# Patient Record
Sex: FEMALE | Race: WHITE | Employment: FULL TIME | URBAN - METROPOLITAN AREA
[De-identification: names, ages, dates, MRNs, and addresses within clinical notes are randomized per-mention and may not be internally consistent; named-entity substitution may affect disease eponyms.]

---

## 2018-12-03 ENCOUNTER — TRANSFERRED RECORDS (OUTPATIENT)
Dept: HEALTH INFORMATION MANAGEMENT | Facility: CLINIC | Age: 23
End: 2018-12-03

## 2018-12-05 ENCOUNTER — TRANSFERRED RECORDS (OUTPATIENT)
Dept: HEALTH INFORMATION MANAGEMENT | Facility: CLINIC | Age: 23
End: 2018-12-05

## 2020-01-02 ENCOUNTER — TRANSFERRED RECORDS (OUTPATIENT)
Dept: HEALTH INFORMATION MANAGEMENT | Facility: CLINIC | Age: 25
End: 2020-01-02

## 2020-01-23 ENCOUNTER — TRANSFERRED RECORDS (OUTPATIENT)
Dept: HEALTH INFORMATION MANAGEMENT | Facility: CLINIC | Age: 25
End: 2020-01-23

## 2020-02-11 ENCOUNTER — TRANSFERRED RECORDS (OUTPATIENT)
Dept: HEALTH INFORMATION MANAGEMENT | Facility: CLINIC | Age: 25
End: 2020-02-11

## 2020-02-13 ENCOUNTER — TRANSFERRED RECORDS (OUTPATIENT)
Dept: HEALTH INFORMATION MANAGEMENT | Facility: CLINIC | Age: 25
End: 2020-02-13

## 2020-02-17 ENCOUNTER — TRANSFERRED RECORDS (OUTPATIENT)
Dept: HEALTH INFORMATION MANAGEMENT | Facility: CLINIC | Age: 25
End: 2020-02-17

## 2020-02-18 ENCOUNTER — TRANSFERRED RECORDS (OUTPATIENT)
Dept: HEALTH INFORMATION MANAGEMENT | Facility: CLINIC | Age: 25
End: 2020-02-18

## 2020-02-25 ENCOUNTER — TRANSFERRED RECORDS (OUTPATIENT)
Dept: HEALTH INFORMATION MANAGEMENT | Facility: CLINIC | Age: 25
End: 2020-02-25

## 2020-02-26 ENCOUNTER — TRANSFERRED RECORDS (OUTPATIENT)
Dept: HEALTH INFORMATION MANAGEMENT | Facility: CLINIC | Age: 25
End: 2020-02-26

## 2020-02-26 LAB
ALT SERPL-CCNC: 16 U/L (ref 8–45)
AST SERPL-CCNC: 15 U/L (ref 11–43)
CREAT SERPL-MCNC: 0.77 MG/DL (ref 0.55–1.28)
GFR SERPL CREATININE-BSD FRML MDRD: >60 ML/MIN/1.73M(2)
GLUCOSE SERPL-MCNC: 104 MG/DL (ref 70–110)
POTASSIUM SERPL-SCNC: 3.7 MMOL/L (ref 3.5–5.1)

## 2020-03-02 ENCOUNTER — TRANSFERRED RECORDS (OUTPATIENT)
Dept: HEALTH INFORMATION MANAGEMENT | Facility: CLINIC | Age: 25
End: 2020-03-02

## 2020-03-04 NOTE — PROGRESS NOTES
[facility] General Surgery Clinic Consultation    CHIEF COMPLAINT:  Chief Complaint   Patient presents with     Consult     RLQ pain       HISTORY OF PRESENT ILLNESS:  Melissa Benitez is a 24 year old female who is seen in consultation at the request of  for evaluation of right inguinal hernia- chronic right lower quadrant pain.  Patient actually presents with 2 separate problems.  For the last couple of weeks, the patient states that she has been having back pain and right-sided abdominal pain, particularly after eating.  She has associated nausea but no vomiting.  No significant reflux.  Due to the severity of her symptoms, the patient was seen in the emergency department in [place].  HIDA scan was completed and demonstrated a decreased ejection fraction at 23%.    In addition, the patient complains of chronic right inguinal pain.  The patient had been suffering from right inguinal pain.  She was evaluated by surgeon in [place] and subsequently underwent open repair of an indirect right inguinal hernia without placement of mesh in December 2018.  Initially, the patient reports that her pain resolved postoperatively.  Approximately 9 months after surgery, the patient reports that she developed recurrence of her right inguinal pain in the same spot that it had previously been.  She has not noticed any associated bulging.  She has undergone steroid injections for the pain, which she states have not provided any significant relief.  Patient has undergone imaging with both CT and pelvic ultrasound.  No significant findings were noted on either exam.  Patient reports she has not undergone any previous abdominal surgical procedures.  She denies use of tobacco.    REVIEW OF SYSTEMS:  Constitutional: No fevers or chills  Eyes: No blurred or double vision  HENT: Reports headaches, No rhinorrhea, No sore throat  Respiratory: No cough or shortness of breath  Cardiovascular: Denies chest pain or  "palpitations  Gastrointestinal: Abdominal pain and nausea  Genitourinary: No hematuria or dysuria  Musculoskeletal: Denies arthralgias or myalgias  Neurologic: No numbness or tingling  Integumentary: No skin rashes    Past medical history:  -Assessed and noncontributory    No past surgical history on file.    Family history:  -Mother with diabetes mellitus  -Grandmother with breast cancer  -Father and sister with \"polycystic disease \"    Social history:  -Patient denies use of tobacco  -Occasional alcohol use    Allergies not on file    No current outpatient medications on file.       Vitals: /62   Pulse 108   BMI= There is no height or weight on file to calculate BMI.    EXAM:  General: Vital signs reviewed, in no apparent distress  Eyes: Anicteric  HENT: Normocephalic, atraumatic, trachea midline   Respiratory: Breathing nonlabored  Cardiovascular: Regular rate and rhythm  GI: Abdomen soft, nondistended; focally tender with palpation in the right upper quadrant of the abdomen; tender with palpation along the right inguinal canal, most pronounced at the medial aspect  Musculoskeletal: No gross deformities  Neurologic: Grossly nonfocal exam  Psychiatric: Normal mood, affect and insight  Integumentary: Warm and dry    All labs and imaging personally reviewed and significant for:   Abdominal CT from 2/17/2020 without evidence of significant findings  HIDA scan from 2/25/2020 with reduced contraction of the gallbladder following CCK challenge, calculated ejection fraction of 23%   Pelvic ultrasound from 2/13/2020 unremarkable    ASSESSMENT:  Melissa Benitez is a 24 year old who presents with biliary dyskinesia and chronic right inguinal pain, status post previous open hernia repair.  Significant pertinent co-morbidities include: Obesity.       PLAN:  A thorough discussion was had today in clinic regarding surgical options for the patient's abdominal issues.  I believe both issues could be addressed " simultaneously through a combined robotic approach.  Robotic cholecystectomy and its risks and benefits were discussed.  During the same procedure, I would then proceed with robotic exploration of the right inguinal region.  If a hernia is identified, I would proceed with repair with mesh.  If no obvious findings are noted, we would proceed with robotic inguinal neurectomy.  Risks and benefits of this procedure were discussed.  Patient is in agreement with proceeding.  Surgery will be scheduled at the patient's earliest convenience.    It was my pleasure to participate in the care of Melissa Benitez in clinic today. Thank you for this consultation.         Kaylee Cobb MD    Please route or send letter to:  Primary Care Provider (PCP)

## 2020-03-06 ENCOUNTER — OFFICE VISIT (OUTPATIENT)
Dept: SURGERY | Facility: CLINIC | Age: 25
End: 2020-03-06
Payer: COMMERCIAL

## 2020-03-06 ENCOUNTER — TELEPHONE (OUTPATIENT)
Dept: SURGERY | Facility: CLINIC | Age: 25
End: 2020-03-06

## 2020-03-06 DIAGNOSIS — K82.8 BILIARY DYSKINESIA: Primary | ICD-10-CM

## 2020-03-06 DIAGNOSIS — R10.31 RIGHT INGUINAL PAIN: ICD-10-CM

## 2020-03-06 PROCEDURE — 99204 OFFICE O/P NEW MOD 45 MIN: CPT | Performed by: SURGERY

## 2020-03-06 ASSESSMENT — MIFFLIN-ST. JEOR: SCORE: 1368.24

## 2020-03-06 NOTE — TELEPHONE ENCOUNTER
Type of surgery: robotic assisted lap alejo, robotic RIH exploration poss neurectomy  Location of surgery: [facility]  Date and time of surgery: 5/20/20 at 7:30am  Surgeon: Dr Cobb  Pre-Op Appt Date: pt to schedule  Post-Op Appt Date: pt to schedule   Packet sent out: Yes  Pre-cert/Authorization completed:  Not Applicable  Date: 3/6/20    General anesthesia  BRP to assist AMRIT

## 2020-03-09 VITALS
SYSTOLIC BLOOD PRESSURE: 116 MMHG | HEIGHT: 59 IN | DIASTOLIC BLOOD PRESSURE: 62 MMHG | WEIGHT: 156 LBS | HEART RATE: 108 BPM | BODY MASS INDEX: 31.45 KG/M2

## 2020-03-09 RX ORDER — LIDOCAINE HYDROCHLORIDE 30 MG/G
CREAM TOPICAL
COMMUNITY
Start: 2019-05-03

## 2020-03-09 RX ORDER — MEDROXYPROGESTERONE ACETATE 150 MG/ML
150 INJECTION, SUSPENSION INTRAMUSCULAR
COMMUNITY
Start: 2019-08-16 | End: 2020-07-17

## 2020-03-09 RX ORDER — METHOCARBAMOL 750 MG/1
TABLET ORAL
Status: ON HOLD | COMMUNITY
Start: 2019-05-03 | End: 2020-05-20

## 2020-03-09 RX ORDER — IBUPROFEN 600 MG/1
600 TABLET, FILM COATED ORAL
COMMUNITY
Start: 2019-07-10

## 2020-03-09 RX ORDER — HYDROCODONE BITARTRATE AND ACETAMINOPHEN 5; 325 MG/1; MG/1
TABLET ORAL
Status: ON HOLD | COMMUNITY
Start: 2019-07-09 | End: 2020-05-20

## 2020-04-23 ENCOUNTER — TRANSFERRED RECORDS (OUTPATIENT)
Dept: HEALTH INFORMATION MANAGEMENT | Facility: CLINIC | Age: 25
End: 2020-04-23

## 2020-04-30 ENCOUNTER — TRANSFERRED RECORDS (OUTPATIENT)
Dept: HEALTH INFORMATION MANAGEMENT | Facility: CLINIC | Age: 25
End: 2020-04-30

## 2020-05-13 DIAGNOSIS — Z11.59 ENCOUNTER FOR SCREENING FOR OTHER VIRAL DISEASES: Primary | ICD-10-CM

## 2020-05-15 ENCOUNTER — NURSE TRIAGE (OUTPATIENT)
Dept: NURSING | Facility: CLINIC | Age: 25
End: 2020-05-15

## 2020-05-15 NOTE — TELEPHONE ENCOUNTER
Caller is calling to get the request for the  covid 19 test to be sent to the hospital in [place] which is closer to her house. She has been on the phone for 2 hours trying to get a hold of the provider to do this. I called the after hours line and sent a page to the on call, Dr Cobb who happens to be her provider for her surgery. Dr Cobb will call the patient back and get all the necessary information to get the order closer to the patients home.    Ariana Love RN/ [facility]         Additional Information    Caller has already spoken with another triager or PCP AND has further questions AND triager able to answer questions.    Protocols used: NO CONTACT OR DUPLICATE CONTACT CALL-A-

## 2020-05-15 NOTE — TELEPHONE ENCOUNTER
Requesting a closer testing site for her Covid testing.  Warm transferred to Covid testing line.    Clarice Georges RN  [facility]

## 2020-05-18 ENCOUNTER — OFFICE VISIT (OUTPATIENT)
Dept: FAMILY MEDICINE | Facility: CLINIC | Age: 25
End: 2020-05-18
Attending: SURGERY
Payer: COMMERCIAL

## 2020-05-18 DIAGNOSIS — Z11.59 ENCOUNTER FOR SCREENING FOR OTHER VIRAL DISEASES: ICD-10-CM

## 2020-05-18 PROCEDURE — 87635 SARS-COV-2 COVID-19 AMP PRB: CPT | Performed by: SURGERY

## 2020-05-18 PROCEDURE — 99207 ZZC NO CHARGE NURSE ONLY: CPT

## 2020-05-18 PROCEDURE — 99000 SPECIMEN HANDLING OFFICE-LAB: CPT | Performed by: SURGERY

## 2020-05-18 NOTE — PROGRESS NOTES
Patient is here for a Pre-op COVID Swab. Swab completed with no concerns, and specimen walked down to lab.     Geno Ruby CMA (AAMA) 5/18/2020 3:37 PM

## 2020-05-19 ENCOUNTER — ANESTHESIA EVENT (OUTPATIENT)
Dept: SURGERY | Facility: CLINIC | Age: 25
End: 2020-05-19
Payer: COMMERCIAL

## 2020-05-19 LAB
SARS-COV-2 RNA SPEC QL NAA+PROBE: NOT DETECTED
SPECIMEN SOURCE: NORMAL

## 2020-05-20 ENCOUNTER — ANESTHESIA (OUTPATIENT)
Dept: SURGERY | Facility: CLINIC | Age: 25
End: 2020-05-20
Payer: COMMERCIAL

## 2020-05-20 ENCOUNTER — APPOINTMENT (OUTPATIENT)
Dept: SURGERY | Facility: PHYSICIAN GROUP | Age: 25
End: 2020-05-20
Payer: COMMERCIAL

## 2020-05-20 ENCOUNTER — HOSPITAL ENCOUNTER (OUTPATIENT)
Facility: CLINIC | Age: 25
Discharge: HOME | End: 2020-05-20
Attending: SURGERY | Admitting: SURGERY
Payer: COMMERCIAL

## 2020-05-20 VITALS
RESPIRATION RATE: 17 BRPM | OXYGEN SATURATION: 98 % | DIASTOLIC BLOOD PRESSURE: 65 MMHG | WEIGHT: 162 LBS | BODY MASS INDEX: 32.72 KG/M2 | SYSTOLIC BLOOD PRESSURE: 121 MMHG | TEMPERATURE: 97 F | HEART RATE: 122 BPM

## 2020-05-20 DIAGNOSIS — K82.8 BILIARY DYSKINESIA: ICD-10-CM

## 2020-05-20 DIAGNOSIS — G89.18 ACUTE POST-OPERATIVE PAIN: Primary | ICD-10-CM

## 2020-05-20 DIAGNOSIS — R10.31 RIGHT INGUINAL PAIN: ICD-10-CM

## 2020-05-20 LAB — HCG UR QL: NEGATIVE

## 2020-05-20 PROCEDURE — 25000128 H RX IP 250 OP 636: Performed by: ANESTHESIOLOGY

## 2020-05-20 PROCEDURE — 25800030 ZZH RX IP 258 OP 636: Performed by: NURSE ANESTHETIST, CERTIFIED REGISTERED

## 2020-05-20 PROCEDURE — 25000128 H RX IP 250 OP 636: Performed by: NURSE ANESTHETIST, CERTIFIED REGISTERED

## 2020-05-20 PROCEDURE — 36000087 ZZH SURGERY LEVEL 8 EA 15 ADDTL MIN: Performed by: SURGERY

## 2020-05-20 PROCEDURE — 88304 TISSUE EXAM BY PATHOLOGIST: CPT | Mod: 26 | Performed by: SURGERY

## 2020-05-20 PROCEDURE — 27210794 ZZH OR GENERAL SUPPLY STERILE: Performed by: SURGERY

## 2020-05-20 PROCEDURE — 25000128 H RX IP 250 OP 636: Performed by: SURGERY

## 2020-05-20 PROCEDURE — 49651 LAP ING HERNIA REPAIR RECUR: CPT | Mod: RT | Performed by: SURGERY

## 2020-05-20 PROCEDURE — 25000125 ZZHC RX 250: Performed by: SURGERY

## 2020-05-20 PROCEDURE — 71000013 ZZH RECOVERY PHASE 1 LEVEL 1 EA ADDTL HR: Performed by: SURGERY

## 2020-05-20 PROCEDURE — 25000125 ZZHC RX 250: Performed by: NURSE ANESTHETIST, CERTIFIED REGISTERED

## 2020-05-20 PROCEDURE — 88304 TISSUE EXAM BY PATHOLOGIST: CPT | Performed by: SURGERY

## 2020-05-20 PROCEDURE — 71000027 ZZH RECOVERY PHASE 2 EACH 15 MINS: Performed by: SURGERY

## 2020-05-20 PROCEDURE — 71000012 ZZH RECOVERY PHASE 1 LEVEL 1 FIRST HR: Performed by: SURGERY

## 2020-05-20 PROCEDURE — C1781 MESH (IMPLANTABLE): HCPCS | Performed by: SURGERY

## 2020-05-20 PROCEDURE — 81025 URINE PREGNANCY TEST: CPT | Performed by: SURGERY

## 2020-05-20 PROCEDURE — 37000009 ZZH ANESTHESIA TECHNICAL FEE, EACH ADDTL 15 MIN: Performed by: SURGERY

## 2020-05-20 PROCEDURE — 47562 LAPAROSCOPIC CHOLECYSTECTOMY: CPT | Performed by: SURGERY

## 2020-05-20 PROCEDURE — 40000170 ZZH STATISTIC PRE-PROCEDURE ASSESSMENT II: Performed by: SURGERY

## 2020-05-20 PROCEDURE — S2900 ROBOTIC SURGICAL SYSTEM: HCPCS | Performed by: SURGERY

## 2020-05-20 PROCEDURE — 36000085 ZZH SURGERY LEVEL 8 1ST 30 MIN: Performed by: SURGERY

## 2020-05-20 PROCEDURE — 25000132 ZZH RX MED GY IP 250 OP 250 PS 637: Performed by: PHYSICIAN ASSISTANT

## 2020-05-20 PROCEDURE — 37000008 ZZH ANESTHESIA TECHNICAL FEE, 1ST 30 MIN: Performed by: SURGERY

## 2020-05-20 PROCEDURE — 47562 LAPAROSCOPIC CHOLECYSTECTOMY: CPT | Mod: AS | Performed by: PHYSICIAN ASSISTANT

## 2020-05-20 PROCEDURE — 49561 ZZHC REPAIR INITIAL INCISIONAL HERNIA; INCARCERATED OR STRANGULATED: CPT | Mod: AS | Performed by: PHYSICIAN ASSISTANT

## 2020-05-20 PROCEDURE — 25000566 ZZH SEVOFLURANE, EA 15 MIN: Performed by: SURGERY

## 2020-05-20 DEVICE — IMPLANTABLE DEVICE: Type: IMPLANTABLE DEVICE | Site: INGUINAL | Status: FUNCTIONAL

## 2020-05-20 RX ORDER — PROPOFOL 10 MG/ML
INJECTION, EMULSION INTRAVENOUS CONTINUOUS PRN
Status: DISCONTINUED | OUTPATIENT
Start: 2020-05-20 | End: 2020-05-20

## 2020-05-20 RX ORDER — MAGNESIUM HYDROXIDE 1200 MG/15ML
LIQUID ORAL PRN
Status: DISCONTINUED | OUTPATIENT
Start: 2020-05-20 | End: 2020-05-20 | Stop reason: HOSPADM

## 2020-05-20 RX ORDER — CEFAZOLIN SODIUM 1 G/3ML
1 INJECTION, POWDER, FOR SOLUTION INTRAMUSCULAR; INTRAVENOUS SEE ADMIN INSTRUCTIONS
Status: DISCONTINUED | OUTPATIENT
Start: 2020-05-20 | End: 2020-05-20 | Stop reason: HOSPADM

## 2020-05-20 RX ORDER — MEPERIDINE HYDROCHLORIDE 25 MG/ML
12.5 INJECTION INTRAMUSCULAR; INTRAVENOUS; SUBCUTANEOUS
Status: DISCONTINUED | OUTPATIENT
Start: 2020-05-20 | End: 2020-05-20 | Stop reason: HOSPADM

## 2020-05-20 RX ORDER — DEXAMETHASONE SODIUM PHOSPHATE 4 MG/ML
INJECTION, SOLUTION INTRA-ARTICULAR; INTRALESIONAL; INTRAMUSCULAR; INTRAVENOUS; SOFT TISSUE PRN
Status: DISCONTINUED | OUTPATIENT
Start: 2020-05-20 | End: 2020-05-20

## 2020-05-20 RX ORDER — AMOXICILLIN 250 MG
1 CAPSULE ORAL 2 TIMES DAILY
Qty: 15 TABLET | Refills: 0 | Status: SHIPPED | OUTPATIENT
Start: 2020-05-20 | End: 2020-05-27

## 2020-05-20 RX ORDER — VECURONIUM BROMIDE 1 MG/ML
INJECTION, POWDER, LYOPHILIZED, FOR SOLUTION INTRAVENOUS PRN
Status: DISCONTINUED | OUTPATIENT
Start: 2020-05-20 | End: 2020-05-20

## 2020-05-20 RX ORDER — NALOXONE HYDROCHLORIDE 0.4 MG/ML
.1-.4 INJECTION, SOLUTION INTRAMUSCULAR; INTRAVENOUS; SUBCUTANEOUS
Status: DISCONTINUED | OUTPATIENT
Start: 2020-05-20 | End: 2020-05-20 | Stop reason: HOSPADM

## 2020-05-20 RX ORDER — KETOROLAC TROMETHAMINE 30 MG/ML
INJECTION, SOLUTION INTRAMUSCULAR; INTRAVENOUS PRN
Status: DISCONTINUED | OUTPATIENT
Start: 2020-05-20 | End: 2020-05-20

## 2020-05-20 RX ORDER — SODIUM CHLORIDE, SODIUM LACTATE, POTASSIUM CHLORIDE, CALCIUM CHLORIDE 600; 310; 30; 20 MG/100ML; MG/100ML; MG/100ML; MG/100ML
INJECTION, SOLUTION INTRAVENOUS CONTINUOUS
Status: DISCONTINUED | OUTPATIENT
Start: 2020-05-20 | End: 2020-05-20 | Stop reason: HOSPADM

## 2020-05-20 RX ORDER — OXYCODONE HYDROCHLORIDE 5 MG/1
5 TABLET ORAL
Status: COMPLETED | OUTPATIENT
Start: 2020-05-20 | End: 2020-05-20

## 2020-05-20 RX ORDER — FENTANYL CITRATE 0.05 MG/ML
25-50 INJECTION, SOLUTION INTRAMUSCULAR; INTRAVENOUS
Status: DISCONTINUED | OUTPATIENT
Start: 2020-05-20 | End: 2020-05-20 | Stop reason: HOSPADM

## 2020-05-20 RX ORDER — LIDOCAINE HYDROCHLORIDE 20 MG/ML
INJECTION, SOLUTION INFILTRATION; PERINEURAL PRN
Status: DISCONTINUED | OUTPATIENT
Start: 2020-05-20 | End: 2020-05-20

## 2020-05-20 RX ORDER — ONDANSETRON 2 MG/ML
4 INJECTION INTRAMUSCULAR; INTRAVENOUS EVERY 30 MIN PRN
Status: DISCONTINUED | OUTPATIENT
Start: 2020-05-20 | End: 2020-05-20 | Stop reason: HOSPADM

## 2020-05-20 RX ORDER — OXYCODONE HYDROCHLORIDE 5 MG/1
5-10 TABLET ORAL EVERY 4 HOURS PRN
Qty: 18 TABLET | Refills: 0 | Status: SHIPPED | OUTPATIENT
Start: 2020-05-20

## 2020-05-20 RX ORDER — ONDANSETRON 4 MG/1
4 TABLET, ORALLY DISINTEGRATING ORAL EVERY 30 MIN PRN
Status: DISCONTINUED | OUTPATIENT
Start: 2020-05-20 | End: 2020-05-20 | Stop reason: HOSPADM

## 2020-05-20 RX ORDER — SODIUM CHLORIDE, SODIUM LACTATE, POTASSIUM CHLORIDE, CALCIUM CHLORIDE 600; 310; 30; 20 MG/100ML; MG/100ML; MG/100ML; MG/100ML
INJECTION, SOLUTION INTRAVENOUS CONTINUOUS PRN
Status: DISCONTINUED | OUTPATIENT
Start: 2020-05-20 | End: 2020-05-20

## 2020-05-20 RX ORDER — CEFAZOLIN SODIUM 2 G/100ML
2 INJECTION, SOLUTION INTRAVENOUS
Status: COMPLETED | OUTPATIENT
Start: 2020-05-20 | End: 2020-05-20

## 2020-05-20 RX ORDER — GLYCOPYRROLATE 0.2 MG/ML
INJECTION, SOLUTION INTRAMUSCULAR; INTRAVENOUS PRN
Status: DISCONTINUED | OUTPATIENT
Start: 2020-05-20 | End: 2020-05-20

## 2020-05-20 RX ORDER — EPHEDRINE SULFATE 50 MG/ML
INJECTION, SOLUTION INTRAMUSCULAR; INTRAVENOUS; SUBCUTANEOUS PRN
Status: DISCONTINUED | OUTPATIENT
Start: 2020-05-20 | End: 2020-05-20

## 2020-05-20 RX ORDER — HYDROMORPHONE HYDROCHLORIDE 1 MG/ML
.3-.5 INJECTION, SOLUTION INTRAMUSCULAR; INTRAVENOUS; SUBCUTANEOUS EVERY 10 MIN PRN
Status: DISCONTINUED | OUTPATIENT
Start: 2020-05-20 | End: 2020-05-20 | Stop reason: HOSPADM

## 2020-05-20 RX ORDER — FENTANYL CITRATE 50 UG/ML
INJECTION, SOLUTION INTRAMUSCULAR; INTRAVENOUS PRN
Status: DISCONTINUED | OUTPATIENT
Start: 2020-05-20 | End: 2020-05-20

## 2020-05-20 RX ORDER — NEOSTIGMINE METHYLSULFATE 1 MG/ML
VIAL (ML) INJECTION PRN
Status: DISCONTINUED | OUTPATIENT
Start: 2020-05-20 | End: 2020-05-20

## 2020-05-20 RX ORDER — BUPIVACAINE HYDROCHLORIDE AND EPINEPHRINE 5; 5 MG/ML; UG/ML
INJECTION, SOLUTION PERINEURAL PRN
Status: DISCONTINUED | OUTPATIENT
Start: 2020-05-20 | End: 2020-05-20 | Stop reason: HOSPADM

## 2020-05-20 RX ORDER — ACETAMINOPHEN 325 MG/1
650 TABLET ORAL EVERY 4 HOURS PRN
COMMUNITY
Start: 2020-05-20

## 2020-05-20 RX ORDER — ONDANSETRON 2 MG/ML
INJECTION INTRAMUSCULAR; INTRAVENOUS PRN
Status: DISCONTINUED | OUTPATIENT
Start: 2020-05-20 | End: 2020-05-20

## 2020-05-20 RX ORDER — PROPOFOL 10 MG/ML
INJECTION, EMULSION INTRAVENOUS PRN
Status: DISCONTINUED | OUTPATIENT
Start: 2020-05-20 | End: 2020-05-20

## 2020-05-20 RX ADMIN — FENTANYL CITRATE 50 MCG: 50 INJECTION, SOLUTION INTRAMUSCULAR; INTRAVENOUS at 08:15

## 2020-05-20 RX ADMIN — PROPOFOL 200 MG: 10 INJECTION, EMULSION INTRAVENOUS at 07:38

## 2020-05-20 RX ADMIN — FENTANYL CITRATE 50 MCG: 0.05 INJECTION, SOLUTION INTRAMUSCULAR; INTRAVENOUS at 11:44

## 2020-05-20 RX ADMIN — PHENYLEPHRINE HYDROCHLORIDE 100 MCG: 10 INJECTION INTRAVENOUS at 08:59

## 2020-05-20 RX ADMIN — ENOXAPARIN SODIUM 40 MG: 40 INJECTION SUBCUTANEOUS at 07:53

## 2020-05-20 RX ADMIN — NEOSTIGMINE METHYLSULFATE 4 MG: 1 INJECTION, SOLUTION INTRAVENOUS at 09:57

## 2020-05-20 RX ADMIN — CEFAZOLIN SODIUM 2 G: 2 INJECTION, SOLUTION INTRAVENOUS at 08:11

## 2020-05-20 RX ADMIN — PHENYLEPHRINE HYDROCHLORIDE 100 MCG: 10 INJECTION INTRAVENOUS at 08:19

## 2020-05-20 RX ADMIN — PHENYLEPHRINE HYDROCHLORIDE 100 MCG: 10 INJECTION INTRAVENOUS at 09:19

## 2020-05-20 RX ADMIN — PHENYLEPHRINE HYDROCHLORIDE 100 MCG: 10 INJECTION INTRAVENOUS at 07:52

## 2020-05-20 RX ADMIN — Medication 5 MG: at 08:32

## 2020-05-20 RX ADMIN — LIDOCAINE HYDROCHLORIDE 80 MG: 20 INJECTION, SOLUTION INFILTRATION; PERINEURAL at 07:38

## 2020-05-20 RX ADMIN — ROCURONIUM BROMIDE 50 MG: 10 INJECTION INTRAVENOUS at 07:40

## 2020-05-20 RX ADMIN — ONDANSETRON 4 MG: 2 INJECTION INTRAMUSCULAR; INTRAVENOUS at 09:50

## 2020-05-20 RX ADMIN — OXYCODONE HYDROCHLORIDE 5 MG: 5 TABLET ORAL at 11:18

## 2020-05-20 RX ADMIN — FENTANYL CITRATE 50 MCG: 0.05 INJECTION, SOLUTION INTRAMUSCULAR; INTRAVENOUS at 10:50

## 2020-05-20 RX ADMIN — KETOROLAC TROMETHAMINE 30 MG: 30 INJECTION, SOLUTION INTRAMUSCULAR at 09:49

## 2020-05-20 RX ADMIN — GLYCOPYRROLATE 0.6 MG: 0.2 INJECTION, SOLUTION INTRAMUSCULAR; INTRAVENOUS at 09:57

## 2020-05-20 RX ADMIN — FENTANYL CITRATE 50 MCG: 50 INJECTION, SOLUTION INTRAMUSCULAR; INTRAVENOUS at 07:38

## 2020-05-20 RX ADMIN — SODIUM CHLORIDE, POTASSIUM CHLORIDE, SODIUM LACTATE AND CALCIUM CHLORIDE: 600; 310; 30; 20 INJECTION, SOLUTION INTRAVENOUS at 09:47

## 2020-05-20 RX ADMIN — SUGAMMADEX 200 MG: 100 INJECTION, SOLUTION INTRAVENOUS at 10:17

## 2020-05-20 RX ADMIN — HYDROMORPHONE HYDROCHLORIDE 0.5 MG: 1 INJECTION, SOLUTION INTRAMUSCULAR; INTRAVENOUS; SUBCUTANEOUS at 08:41

## 2020-05-20 RX ADMIN — MIDAZOLAM 2 MG: 1 INJECTION INTRAMUSCULAR; INTRAVENOUS at 07:33

## 2020-05-20 RX ADMIN — VECURONIUM BROMIDE 2 MG: 1 INJECTION, POWDER, LYOPHILIZED, FOR SOLUTION INTRAVENOUS at 08:16

## 2020-05-20 RX ADMIN — VECURONIUM BROMIDE 1 MG: 1 INJECTION, POWDER, LYOPHILIZED, FOR SOLUTION INTRAVENOUS at 09:26

## 2020-05-20 RX ADMIN — PROPOFOL 30 MCG/KG/MIN: 10 INJECTION, EMULSION INTRAVENOUS at 07:54

## 2020-05-20 RX ADMIN — PHENYLEPHRINE HYDROCHLORIDE 100 MCG: 10 INJECTION INTRAVENOUS at 07:48

## 2020-05-20 RX ADMIN — PHENYLEPHRINE HYDROCHLORIDE 100 MCG: 10 INJECTION INTRAVENOUS at 08:33

## 2020-05-20 RX ADMIN — SODIUM CHLORIDE, POTASSIUM CHLORIDE, SODIUM LACTATE AND CALCIUM CHLORIDE: 600; 310; 30; 20 INJECTION, SOLUTION INTRAVENOUS at 07:33

## 2020-05-20 RX ADMIN — VECURONIUM BROMIDE 2 MG: 1 INJECTION, POWDER, LYOPHILIZED, FOR SOLUTION INTRAVENOUS at 08:55

## 2020-05-20 RX ADMIN — DEXAMETHASONE SODIUM PHOSPHATE 4 MG: 4 INJECTION, SOLUTION INTRA-ARTICULAR; INTRALESIONAL; INTRAMUSCULAR; INTRAVENOUS; SOFT TISSUE at 07:54

## 2020-05-20 ASSESSMENT — ENCOUNTER SYMPTOMS: SEIZURES: 0

## 2020-05-20 NOTE — ANESTHESIA PREPROCEDURE EVALUATION
Anesthesia Pre-Procedure Evaluation    Patient: Melissa Patel   MRN: [id number] : 1995          Preoperative Diagnosis: Biliary dyskinesia [K82.8]  Right inguinal pain [R10.31]    Procedure(s):  CHOLECYSTECTOMY, ROBOT-ASSISTED, LAPAROSCOPIC, WITHOUT CHOLANGIOGRAM  Robotic right inguinal exploration, possible inguinal hernia repair with mesh, possible neurectomy    Past Medical History:   Diagnosis Date     Uncomplicated asthma      Past Surgical History:   Procedure Laterality Date     HERNIA REPAIR  2018       Anesthesia Evaluation     .             ROS/MED HX    ENT/Pulmonary:     (+)Intermittent asthma Treatment: Inhaler prn,  , . .   (-) recent URI and Other pulmonary disease   Neurologic:      (-) seizures and migraines   Cardiovascular:  - neg cardiovascular ROS       METS/Exercise Tolerance:     Hematologic:     (+) Anemia, -     (-) history of blood clots   Musculoskeletal:         GI/Hepatic: Comment: biliary dyskinesia and chronic right inguinal pain, status post previous open hernia repair    (+) cholecystitis/cholelithiasis,      (-) GERD and liver disease   Renal/Genitourinary:      (-) renal disease   Endo:      (-) Type II DM and thyroid disease   Psychiatric:         Infectious Disease:         Malignancy:         Other:                          Physical Exam  Normal systems: cardiovascular, pulmonary and dental    Airway   Mallampati: II  TM distance: >3 FB  Neck ROM: full    Dental     Cardiovascular       Pulmonary             Lab Results   Component Value Date    POTASSIUM 3.7 2020    CR 0.77 2020     (A) 2020    ALT 16 2020    AST 15 2020    HCG Negative 2020       Preop Vitals  BP Readings from Last 3 Encounters:   20 117/73   20 116/62    Pulse Readings from Last 3 Encounters:   20 108      Resp Readings from Last 3 Encounters:   20 16    SpO2 Readings from Last 3 Encounters:   No data found for SpO2     "  Temp Readings from Last 1 Encounters:   05/20/20 36.6  C (97.9  F) (Tympanic)    Ht Readings from Last 1 Encounters:   03/06/20 1.499 m (4' 11\")      Wt Readings from Last 1 Encounters:   05/20/20 73.5 kg (162 lb)    Estimated body mass index is 32.72 kg/m  as calculated from the following:    Height as of 3/6/20: 1.499 m (4' 11\").    Weight as of this encounter: 73.5 kg (162 lb).       Anesthesia Plan      History & Physical Review  History and physical reviewed and following examination; no interval change.    ASA Status:  2 .    NPO Status:  > 8 hours    Plan for General with Intravenous and Propofol induction. Maintenance will be Balanced.    PONV prophylaxis:  Ondansetron (or other 5HT-3) and Dexamethasone or Solumedrol         Postoperative Care  Postoperative pain management:  IV analgesics and Multi-modal analgesia.      Consents  Anesthetic plan, risks, benefits and alternatives discussed with:  Patient.  Use of blood products discussed: Yes.   Use of blood products discussed with Patient.  Consented to blood products.  .                 James Aocsta,   "

## 2020-05-20 NOTE — OP NOTE
Procedure Date: 05/20/2020      STAFF SURGEON:  Kaylee Cobb MD      ASSISTANT:  Odalys Whiteside PA-C      PREOPERATIVE DIAGNOSES:   1.  Biliary dyskinesia.   2.  Right inguinal pain.      POSTOPERATIVE DIAGNOSES:   1.  Biliary dyskinesia.   2.  Recurrent indirect right inguinal hernia.      PROCEDURES PERFORMED:   1.  Robotic repair of recurrent right inguinal hernia with placement of preperitoneal mesh.   2.  Robotic cholecystectomy.      INDICATIONS:  Melissa is a 24-year-old female who presented to the clinic with 2 complaints.  The patient was having chronic right upper quadrant abdominal pain, particularly after eating.  She did have associated nausea.  HIDA scan was completed and demonstrated a decreased ejection fraction, consistent with biliary dyskinesia.  In addition, the patient had undergone previous open repair of right inguinal hernia without placement of mesh.  Postoperatively, she has developed recurrent right inguinal pain.  She has not received any relief after having undergone steroid injections.  The decision was made to proceed to the operating room for robotic exploration of the patient's right inguinal area and robotic cholecystectomy.  The risks and benefits of the procedure were discussed with the patient, and consent for the procedure was obtained.      ANESTHESIA:  General.      DESCRIPTION OF PROCEDURE:  The patient was brought to the preoperative area, and preoperative antibiotics were administered.  The patient was brought back to the operating room and placed in the supine position on the operating table.  A timeout was completed.  Anesthesia was induced.  The patient was intubated.  The patient was secured to the operating table, and her pressure points were padded.  The patient's abdomen was prepped and draped in a sterile fashion.  Following infiltration with local anesthetic, the 11 blade scalpel was used to make a small incision overlying the patient's left upper quadrant.   Entrance into the abdomen was then gained under direct visualization using the 5 mm Visiport and a 5 mm laparoscope.  When the patient's abdomen had been safely entered, it was fully insufflated.  The laparoscope was reintroduced into the abdominal cavity, and the initial inspection revealed no evidence of injury at the port entry site.  Under direct visualization, 3 additional 8 mm robotic ports were placed in a straight line, extending across the patient's mid abdomen.  The patient's left upper quadrant port was then upsized to an 8 mm robotic port.  The robot was then brought onto the field and docked.  The robotic camera, robotic fenestrated grasper, and robotic scissors were introduced into the abdominal cavity under direct visualization.  We explored the patient's right inguinal area.  There were no obvious findings.  We then incised the peritoneum from the level of the anterior superior iliac spine to the level of the midline, superior to the area of the inguinal canal.  The patient's preperitoneal space was then developed using blunt dissection and the robotic scissors.  There was a small plug of preperitoneal fat that was protruding through the patient's internal ring, consistent with a recurrent indirect inguinal hernia.  This plug of fat was reduced back into the preperitoneal space.  We then continued our dissection medially to the pubis.  The peritoneal edge was then swept inferiorly along the length of our preperitoneal pocket.  We dissected out laterally to accommodate placement of the mesh.  The decision was then made to proceed with placement of right-sided preperitoneal mesh.  A piece of large Dextile mesh was rolled and inserted into the abdominal cavity.  The mesh was situated in the preperitoneal space so as to cover all potential hernia defects.  The mesh was sutured into place medially at Tom's ligament and lateral to the epigastric vessels using interrupted Vicryl sutures.  When the mesh  was adequately in position, we then reapproximated the peritoneum over the top of the mesh using a running 3-0 Stratafix suture.  The plug of preperitoneal fat that had been reduced from the patient's right internal ring was incorporated into our peritoneal closure to keep it from slipping back under the mesh and into the inguinal canal.  The robotic equipment was then removed from the abdominal cavity, the robot was undocked, and the boom was swung around to now face superiorly and the abdominal cavity.  We then reinserted the tip-up grasper, the fenestrated grasper, and the robotic scissors under direct visualization.  The patient's gallbladder was grasped and retracted cephalad over the dome of the liver.  We then carefully dissected out the patient's cystic duct and cystic artery utilizing the fenestrated grasper and the robotic scissors.  When both structures could be seen coursing directly into the gallbladder, and the critical view had been obtained, both structures were clipped with Hem-o-Asael clips and divided using the robotic scissors.  The robotic scissors with electrocautery was then used to carefully separate the gallbladder from its attachments to the liver bed.  When the gallbladder had been completely dissected free, it was placed in an Endocatch bag and removed through the left upper quadrant port site.  The surgical site was inspected and found to be hemostatic.  The fascial defect at the patient's left upper quadrant port site was then reapproximated using the fascial closure device and a 0 Vicryl suture.  The remaining robotic instruments were removed from the abdominal cavity under direct visualization.  The robotic ports were removed, and the patient's abdomen was allowed to desufflate fully.  The port sites were inspected, and hemostasis was ensured.  The port sites were then reapproximated using 4-0 Monocryl subcuticular stitches.  The incisions were washed and dried, and skin glue was  applied.  The lap, needle and instrument counts were correct at the end of the procedure.  The patient tolerated the procedure well, was extubated and taken to the recovery area in stable condition.     Odalys Whiteside PA-C assisted in the above procedure. They provided assistance with pre-operative positioning, prepping, and draping of the patient. The assistant provided vital operative assistance with retraction using instruments thus providing the necessary exposure and visualization for the case, manipulation of tissues to achieve hemostasis, and assisted in closure of the wound. Post-operatively they assisted in transfer of the patient off the operative table and transition to the PACU physicians.       ESTIMATED BLOOD LOSS:  10 mL.      FINDINGS:  Normal-appearing biliary anatomy.  Recurrent indirect right inguinal hernia, containing a plug of preperitoneal fat, protruding through the internal ring.  Preperitoneal fat was reduced from the internal ring and large right-sided Dextile preperitoneal mesh placed.      COMPLICATIONS:  None.      DRAINS:  None.      SPECIMENS:  Gallbladder and contents.      CONDITION:  The patient will be discharged home directly from the postanesthesia care unit with instructions for postoperative care and medications for pain management.         YOU GRESHAM MD             D: 2020   T: 2020   MT:       Name:     HECTOR MADISON   MRN:      [id number]        Account:        [id number]   :      1995           Procedure Date: 2020      Document: [id number]

## 2020-05-20 NOTE — ANESTHESIA CARE TRANSFER NOTE
Patient: Melissa Patel    Procedure(s):  CHOLECYSTECTOMY, ROBOT-ASSISTED, LAPAROSCOPIC, WITHOUT CHOLANGIOGRAM  ROBOTIC ASSISTED RIGHT INGUINAL EXPLORATION AND HERNIA REPAIR WITH MESH    Diagnosis: Biliary dyskinesia [K82.8]  Right inguinal pain [R10.31]  Diagnosis Additional Information: No value filed.    Anesthesia Type:   General     Note:  Airway :Face Mask  Patient transferred to:PACU  Comments: Neuromuscular blockade reversed after TOF 2/4, spontaneous respirations, adequate tidal volumes, followed commands to voice, oropharynx suctioned with soft flexible catheter, extubated atraumatically, extubated with suction, airway patent after extubation.  Oxygen via facemask at 6 liters per minute to PACU. Oxygen tubing connected to wall O2 in PACU, SpO2, NiBP, and EKG monitors and alarms on and functioning, Edenilson Hugger warmer connected to patient gown, report on patient's clinical status given to PACU RN, RN questions answered.   Handoff Report: Identifed the Patient, Identified the Reponsible Provider, Reviewed the pertinent medical history, Discussed the surgical course, Reviewed Intra-OP anesthesia mangement and issues during anesthesia, Set expectations for post-procedure period and Allowed opportunity for questions and acknowledgement of understanding      Vitals: (Last set prior to Anesthesia Care Transfer)    CRNA VITALS  5/20/2020 0951 - 5/20/2020 1025      5/20/2020             Pulse:  115    SpO2:  100 %    Resp Rate (set):  10                Electronically Signed By: DADA Tapia CRNA  May 20, 2020  10:25 AM

## 2020-05-20 NOTE — BRIEF OP NOTE
[facility]    Brief Operative Note    Pre-operative diagnosis: Biliary dyskinesia [K82.8]  Right inguinal pain [R10.31]  Post-operative diagnosis Biliary dyskinesia and recurrent indirect right inguinal hernia    Procedure: Procedure(s):  CHOLECYSTECTOMY, ROBOT-ASSISTED, LAPAROSCOPIC, WITHOUT CHOLANGIOGRAM  ROBOTIC ASSISTED RIGHT INGUINAL EXPLORATION AND HERNIA REPAIR WITH MESH  Surgeon: Surgeon(s) and Role:  Panel 1:     * Kaylee Cobb MD - Primary     * Odalys Whiteside PA-C - Assisting     * Abdulkadir Cain MD - Assisting  Panel 2:     * Kaylee Cobb MD - Primary     * Odalys Whiteside PA-C - Assisting     * Abdulkadir Cain MD - Assisting  Anesthesia: General   Estimated blood loss: 10 ml    Drains: None  Specimens:   ID Type Source Tests Collected by Time Destination   A : GALLBLADDER AND CONTENTS  Tissue Gallbladder and Contents SURGICAL PATHOLOGY EXAM Kaylee Cobb MD 5/20/2020  9:41 AM      Findings:   Normal appearing biliary anatomy.  Recurrent indirect right inguinal hernia containing a plug of preperitoneal fat.  Preperitoneal fat reduced from internal ring and large right sided Dextile preperitoneal mesh placed.  Complications: None.  Implants:   Implant Name Type Inv. Item Serial No.  Lot No. LRB No. Used Action   DEXTILE ANATOMICAL MESH [id number]    [employer] [id number] Right 1 Implanted     Kaylee Cobb MD

## 2020-05-20 NOTE — OR NURSING
Patient's HR consistently over 100, high as 127. [facility] notified. No new orders.

## 2020-05-20 NOTE — DISCHARGE INSTRUCTIONS
Same Day Surgery Discharge Instructions for  Sedation and General Anesthesia       It's not unusual to feel dizzy, light-headed or faint for up to 24 hours after surgery or while taking pain medication.  If you have these symptoms: sit for a few minutes before standing and have someone assist you when you get up to walk or use the bathroom.      You should rest and relax for the next 24 hours. We recommend you make arrangements to have an adult stay with you for at least 24 hours after your discharge.  Avoid hazardous and strenuous activity.      DO NOT DRIVE any vehicle or operate mechanical equipment for 24 hours following the end of your surgery.  Even though you may feel normal, your reactions may be affected by the medication you have received.      Do not drink alcoholic beverages for 24 hours following surgery.       Slowly progress to your regular diet as you feel able. It's not unusual to feel nauseated and/or vomit after receiving anesthesia.  If you develop these symptoms, drink clear liquids (apple juice, ginger ale, broth, 7-up, etc. ) until you feel better.  If your nausea and vomiting persists for 24 hours, please notify your surgeon.        All narcotic pain medications, along with inactivity and anesthesia, can cause constipation. Drinking plenty of liquids and increasing fiber intake will help.      For any questions of a medical nature, call your surgeon.      Do not make important decisions for 24 hours.      If you had general anesthesia, you may have a sore throat for a couple of days related to the breathing tube used during surgery.  You may use Cepacol lozenges to help with this discomfort.  If it worsens or if you develop a fever, contact your surgeon.       If you feel your pain is not well managed with the pain medications prescribed by your surgeon, please contact your surgeon's office to let them know so they can address your concerns.       CoVid 19 Information    We want to give you  information regarding Covid. Please consult your primary care provider with any questions you might have.     Patient who have symptoms (cough, fever, or shortness of breath), need to isolate for 7 days from when symptoms started OR 72 hours after fever resolves (without fever reducing medications) AND improvement of respiratory symptoms (whichever is longer).      Isolate yourself at home (in own room/own bathroom if possible)    Do Not allow any visitors    Do Not go to work or school    Do Not go to [employer],  centers, shopping, or other public places.    Do Not shake hands.    Avoid close and intimate contact with others (hugging, kissing).    Follow CDC recommendations for household cleaning of frequently touched services.     After the initial 7 days, continue to isolate yourself from household members as much as possible. To continue decrease the risk of community spread and exposure, you and any members of your household should limit activities in public for 14 days after starting home isolation.     You can reference the following CDC link for helpful home isolation/care tips:  https://www.cdc.gov/coronavirus/2019-ncov/downloads/10Things.pdf    Protect Others:    Cover Your Mouth and Nose with a mask, disposable tissue or wash cloth to avoid spreading germs to others.    Wash your hands and face frequently with soap and water    Call Your Primary Doctor If: Breathing difficulty develops or you become worse.    For more information about COVID19 and options for caring for yourself at home, please visit the CDC website at https://www.cdc.gov/coronavirus/2019-ncov/about/steps-when-sick.html  For more options for care at [facility], please visit our website at [email or web address]          [facility]  Discharge Instructions: Post-Operative Robotic Cholecystectomy and Inguinal hernia repair    ACTIVITY    Expect to feel tired after your  surgery.  This will gradually resolve.      Take frequent, short walks and increase your activity gradually.      Avoid strenuous physical activity or heavy lifting greater than 15-20 lbs. for 3-4 weeks.  You may climb stairs.    You may drive without restrictions when you are not using any prescription pain medication and feel comfortable in a car.    You may return to work/school when you are comfortable without any prescription pain medication.    WOUND CARE    You may shower in 24 hours. Pat your incisions dry and leave them open to air.  You may apply a dry dressing (Band-Aids or gauze/tape) over your incisions if needed for comfort or drainage.    You have Skin Glue on your incisions. Do not pick at the glue. It will slowly fall off over a few weeks.    Do not soak your incisions in a tub or pool for 2 weeks.     Do not apply any lotions, creams, or ointments to your incisions.    A ridge under your incisions is normal and will gradually resolve.    DIET    Start with liquids, then gradually resume your regular diet as tolerated.  Avoid heavy, spicy, and greasy meals for 2-3 days.    Drink plenty of fluids to stay hydrated.    It is not uncommon to experience some loose stools or diarrhea after surgery.  This is your body s way of adapting to the bile which will slowly drain into your intestine.  A low fat diet may help with this.  This should improve over 1-2 months.    PAIN    Expect some tenderness and discomfort at the incision sites.  Use the prescribed pain medication at your discretion.  Expect gradual resolution of your pain over several days.    You may take ibuprofen with food (unless you have been told not to) instead of or in addition to your prescribed pain medication.  If you are taking Norco or Percocet, do not take any additional acetaminophen/APAP/Tylenol.    Do not drink alcohol or drive while you are taking pain medications.    You may apply ice to your incisions in 20 minute intervals as  needed for the next 48 hours.  After that time, consider switching to heat if you prefer.    EXPECTATIONS    Pain medications can cause constipation.  Limit use when possible.  Take over the counter stool softener/stimulant, such as Colace or Senna, 1-2 times a day with plenty of water.  You may take a mild over the counter laxative, such as Miralax or a suppository, as needed.  You may discontinue these medications once you are having regular bowel movements and/or are no longer taking your narcotic pain medication.      You may have shoulder or upper back discomfort due to the gas used in surgery.  This is temporary and should resolve in 48-72 hours.  Short, frequent walks may help with this.    FOLLOW UP    Dr. Cobb would like to do a virtual visit with you in approximately 2 weeks. Call her office at [phone] to schedule this. If you have any questions or concerns before your appointment, please call us at [phone] and ask to speak with our nurse.  We are located at [address] [place] [address].    CALL OUR OFFICE -[phone] IF YOU HAVE:     Chills or fever above 101 F.    Increased redness, warmth, or drainage at your incisions.    Significant bleeding.    Pain not relieved by your pain medication or rest.    Increasing pain after the first 48 hours.    Any other concerns or questions.      You were given the medication Sugammadex that may decrease effectiveness of birth control.  We recommend you use a backup method of birth control for 7 days after surgery.  Continue to take your hormonal contraceptive during this period.    Today you received Toradol, an antiinflammatory medication similar to Ibuprofen.  You should not take other antiinflammatory medication, such as Ibuprofen, Motrin, Advil, Aleve, Naprosyn, etc until 3:50 PM.

## 2020-05-20 NOTE — ANESTHESIA POSTPROCEDURE EVALUATION
Patient: Melissa Patel    Procedure(s):  CHOLECYSTECTOMY, ROBOT-ASSISTED, LAPAROSCOPIC, WITHOUT CHOLANGIOGRAM  ROBOTIC ASSISTED RIGHT INGUINAL EXPLORATION AND HERNIA REPAIR WITH MESH    Diagnosis:Biliary dyskinesia [K82.8]  Right inguinal pain [R10.31]  Diagnosis Additional Information: No value filed.    Anesthesia Type:  General    Note:  Anesthesia Post Evaluation    Patient location during evaluation: PACU  Patient participation: Able to fully participate in evaluation  Level of consciousness: awake and alert  Pain management: adequate  Airway patency: patent  Cardiovascular status: acceptable and hemodynamically stable  Respiratory status: acceptable and unassisted  Hydration status: acceptable  PONV: none             Last vitals:  Vitals:    05/20/20 1130 05/20/20 1145 05/20/20 1200   BP: 112/62 110/68 121/65   Pulse: 113 100 122   Resp: 16 15 17   Temp:      SpO2: 100% 99% 98%         Electronically Signed By: James Acosta DO  May 20, 2020  2:48 PM

## 2020-05-21 LAB — COPATH REPORT: NORMAL

## 2020-05-27 ENCOUNTER — TELEPHONE (OUTPATIENT)
Dept: SURGERY | Facility: CLINIC | Age: 25
End: 2020-05-27
Payer: COMMERCIAL

## 2020-05-27 DIAGNOSIS — R10.84 ABDOMINAL PAIN, GENERALIZED: Primary | ICD-10-CM

## 2020-05-27 PROCEDURE — 99024 POSTOP FOLLOW-UP VISIT: CPT | Performed by: SURGERY

## 2020-05-27 RX ORDER — KETOROLAC TROMETHAMINE 10 MG/1
10 TABLET, FILM COATED ORAL EVERY 6 HOURS PRN
Qty: 20 TABLET | Refills: 0 | Status: SHIPPED | OUTPATIENT
Start: 2020-05-27 | End: 2020-06-01

## 2020-05-27 RX ORDER — POLYETHYLENE GLYCOL 3350 17 G/17G
1 POWDER, FOR SOLUTION ORAL 2 TIMES DAILY
Qty: 10 PACKET | Refills: 0 | Status: SHIPPED | OUTPATIENT
Start: 2020-05-27

## 2020-05-27 NOTE — TELEPHONE ENCOUNTER
Name of caller: Patient    Reason for Call:  Medication     oxyCODONE (ROXICODONE) 5 MG tablet   Still having a great deal of pain and some constipation.    Surgeon:  Reza    Recent Surgery:  Yes.    If yes, when & what type:  5/20/20    CHOLECYSTECTOMY, ROBOT-ASSISTED, LAPAROSCOPIC, WITHOUT CHOLANGIOGRAM     Best phone number to reach pt at is: [phone]    Ok to leave a message with medical info? Yes.    Pharmacy preferred (if calling for a refill):     [facility] - [place], [place] - [facility]

## 2020-05-27 NOTE — TELEPHONE ENCOUNTER
Procedure:  1. Robotic repair of recurrent right inguinal hernia with placement of preperitoneal mesh 2.  Robotic cholecystectomy    Date: 05/20/2020    Surgeon:  Reza      Patient and her mother calling, requesting more pain medications, as well as to discuss constipation.    Informed them at this point post op, it is advised to try to utilize tylenol and ibuprofen for pain relief, rather than any narcotics.  Also informed them that narcotics will increase the symptoms of constipation, which could be contributing to patient's pain.    Patient reports that she has been unable to sleep because of the pain. Tylenol and ibuprofen are not working.  She has also been using senna twice daily and has had minimal bowel movements.    Informed them that the senna can also contribute to some abdominal pain due to stimulating effects.  Recommend patient try milk of magnesia.    Patient is using ice packs.  Informed her she can try to use heat at this time as well for additional comfort measures.    She reports that she is having more pain on the left than the right and is unsure why.    Patient's mom, then got on the call and insisted that patient receive some type of medication for pain relief as she can not watch her daughter be in so much pain anymore.  She feels that there was not enough pain medication sent home after surgery and that it needs to be handled and taken care of.    Patient's mom is also upset that she did not ever receive a call from the surgeon to discuss how surgery went.  Her voice was raised during entire conversation and she reports that we are not handling this appropriately.    Will ask Dr. Cobb to call patient's mother to discuss concerns and offer recommendations/suggestions.    Phone number:  [phone]    Marci Campbell RN-BSN

## 2020-05-27 NOTE — TELEPHONE ENCOUNTER
Surgery:    As requested, the patient and her mother were contacted via telephone.  The patient has been having pain at her incisions and pain at the right and left lateral aspects of the abdomen.  She is also having issues with constipation.  She is out of her narcotic pain medication and has been taking Tylenol and Advil.  She has been applying ice.  The patient is tolerating oral intake.  No nausea or vomiting.  Prescriptions were provided for oral Toradol, MiraLAX, and a heating pad.  These were sent to the patient's pharmacy of choice.  Surgical findings were discussed.  The patient and her mother are in agreement with the treatment plan.  They were encouraged to contact our clinic directly if they are having any further issues.    Kaylee Cobb MD

## 2020-08-18 ENCOUNTER — MEDICAL CORRESPONDENCE (OUTPATIENT)
Dept: HEALTH INFORMATION MANAGEMENT | Facility: CLINIC | Age: 25
End: 2020-08-18

## 2020-08-18 ENCOUNTER — TRANSFERRED RECORDS (OUTPATIENT)
Dept: HEALTH INFORMATION MANAGEMENT | Facility: CLINIC | Age: 25
End: 2020-08-18

## 2020-08-19 ENCOUNTER — MEDICAL CORRESPONDENCE (OUTPATIENT)
Dept: HEALTH INFORMATION MANAGEMENT | Facility: CLINIC | Age: 25
End: 2020-08-19

## 2020-08-19 ENCOUNTER — TRANSCRIBE ORDERS (OUTPATIENT)
Dept: OTHER | Age: 25
End: 2020-08-19

## 2020-08-19 DIAGNOSIS — M75.41 IMPINGEMENT SYNDROME OF RIGHT SHOULDER: Primary | ICD-10-CM

## 2020-08-21 ENCOUNTER — DOCUMENTATION ONLY (OUTPATIENT)
Dept: ORTHOPEDICS | Facility: CLINIC | Age: 25
End: 2020-08-21

## 2020-08-21 NOTE — PROGRESS NOTES
Action 8/21/20 MJ   Action Taken Received cd from [facility] with MRI rt shoulder 7.16.19. Sent to  for processing.

## 2020-08-26 DIAGNOSIS — M25.511 RIGHT SHOULDER PAIN: Primary | ICD-10-CM

## 2020-08-27 ENCOUNTER — OFFICE VISIT (OUTPATIENT)
Dept: ORTHOPEDICS | Facility: CLINIC | Age: 25
End: 2020-08-27
Payer: COMMERCIAL

## 2020-08-27 ENCOUNTER — ANCILLARY PROCEDURE (OUTPATIENT)
Dept: GENERAL RADIOLOGY | Facility: CLINIC | Age: 25
End: 2020-08-27
Attending: ORTHOPAEDIC SURGERY
Payer: COMMERCIAL

## 2020-08-27 VITALS
OXYGEN SATURATION: 98 % | HEART RATE: 99 BPM | WEIGHT: 156.4 LBS | HEIGHT: 59 IN | DIASTOLIC BLOOD PRESSURE: 80 MMHG | BODY MASS INDEX: 31.53 KG/M2 | SYSTOLIC BLOOD PRESSURE: 116 MMHG

## 2020-08-27 DIAGNOSIS — M25.511 CHRONIC RIGHT SHOULDER PAIN: Primary | ICD-10-CM

## 2020-08-27 DIAGNOSIS — M25.511 RIGHT SHOULDER PAIN: ICD-10-CM

## 2020-08-27 DIAGNOSIS — G89.29 CHRONIC RIGHT SHOULDER PAIN: Primary | ICD-10-CM

## 2020-08-27 PROCEDURE — 73030 X-RAY EXAM OF SHOULDER: CPT | Mod: RT | Performed by: RADIOLOGY

## 2020-08-27 PROCEDURE — 99203 OFFICE O/P NEW LOW 30 MIN: CPT | Performed by: ORTHOPAEDIC SURGERY

## 2020-08-27 ASSESSMENT — PAIN SCALES - GENERAL: PAINLEVEL: SEVERE PAIN (6)

## 2020-08-27 ASSESSMENT — MIFFLIN-ST. JEOR: SCORE: 1370.06

## 2020-08-27 NOTE — NURSING NOTE
"Melissa Perazaman's chief complaint for this visit includes:  Chief Complaint   Patient presents with     Right Shoulder - Pain     and clavicle pain.     [facility]P: [facility]    Referring Provider:  No referring provider defined for this encounter.    /80 (BP Location: Left arm, Patient Position: Sitting, Cuff Size: Adult Regular)   Pulse 99   Ht 1.499 m (4' 11\")   Wt 70.9 kg (156 lb 6.4 oz)   SpO2 98%   BMI 31.59 kg/m    Severe Pain (6)     Do you need any medication refills at today's visit? No    Perlita Zhu CMA        "

## 2020-08-27 NOTE — LETTER
8/27/2020         RE: Melissa Patel  [id number] [address] [place] [address]        Dear Colleague,    Thank you for referring your patient, Melissa Patel, to the [facility]. Please see a copy of my visit note below.    CHIEF CONCERN: Right shoulder pain, right clavicle pain    HISTORY OF PRESENT ILLNESS:  Melissa is a pleasant 24 year old right hand dominant woman who presents for evaluation of the above concern.  Pain began on 7/1/2019 after lifting heavy boxes off a shelf at work.  She felt a pop and had immediate pain.  She was evaluated at an emergency department and sent home with a sling.  She saw a family practice provider and Dr. Marinelli in orthopedics who ordered x-rays, an MRI and physical therapy.  She did physical therapy for approximately 4 months and was discharged due to reaching a plateau in improvement. She has also had 2 corticosteroid injections.  The first injection was not beneficial. The last injection on 4/30/2020 brought relief in shoulder symptoms for about 3 months, however she continued to have clavicle pain.  She has difficulty with repetitive motions with the right arm and is unable to lift with the right arm.  Pain today is a 6 on a scale of 1-10.  She localizes pain to 3 sites: 1) posterior acromion; 2) medial clavicle near but not quite at the right sternoclavicular joint; 3)Periscapular muscles.      Past Medical History:   Diagnosis Date     Uncomplicated asthma        Past Surgical History:   Procedure Laterality Date     DAVINCI LAPAROSCOPIC CHOLECYSTECTOMY WITHOUT GRAMS N/A 5/20/2020    Procedure: CHOLECYSTECTOMY, ROBOT-ASSISTED, LAPAROSCOPIC, WITHOUT CHOLANGIOGRAM;  Surgeon: Kaylee Cobb MD;  Location:  OR     DAVINCI XI HERNIORRHAPHY INGUINAL Right 5/20/2020    Procedure: ROBOTIC ASSISTED RIGHT INGUINAL EXPLORATION AND HERNIA REPAIR WITH MESH;  Surgeon: Kaylee Cobb MD;  Location:  OR     HERNIA REPAIR   12/2018       Current Outpatient Medications   Medication Sig Dispense Refill     acetaminophen (TYLENOL) 325 MG tablet Take 2 tablets (650 mg) by mouth every 4 hours as needed for pain       ibuprofen (ADVIL/MOTRIN) 600 MG tablet Take 600 mg by mouth       lidocaine HCl 3 % cream        medroxyPROGESTERone (DEPO-PROVERA) 150 MG/ML IM injection Inject 150 mg into the muscle       oxyCODONE (ROXICODONE) 5 MG tablet Take 1-2 tablets (5-10 mg) by mouth every 4 hours as needed for moderate to severe pain 18 tablet 0     polyethylene glycol (MIRALAX) 17 g packet Take 17 g by mouth 2 times daily Take 1 packet twice daily until having bowel movements.  Then take 1 packet daily until bowel movements become regular. 10 packet 0        No Known Allergies    SOCIAL HISTORY:    Social History     Socioeconomic History     Marital status: Single     Spouse name: Not on file     Number of children: Not on file     Years of education: Not on file     Highest education level: Not on file   Occupational History     Not on file   Social Needs     Financial resource strain: Not on file     Food insecurity     Worry: Not on file     Inability: Not on file     Transportation needs     Medical: Not on file     Non-medical: Not on file   Tobacco Use     Smoking status: Never Smoker     Smokeless tobacco: Never Used   Substance and Sexual Activity     Alcohol use: Yes     Comment: 1 every few months     Drug use: Not Currently     Sexual activity: Not on file   Lifestyle     Physical activity     Days per week: Not on file     Minutes per session: Not on file     Stress: Not on file   Relationships     Social connections     Talks on phone: Not on file     Gets together: Not on file     Attends [employer] service: Not on file     Active member of club or organization: Not on file     Attends meetings of clubs or organizations: Not on file     Relationship status: Not on file     Intimate partner violence     Fear of current or ex partner:  Not on file     Emotionally abused: Not on file     Physically abused: Not on file     Forced sexual activity: Not on file   Other Topics Concern     Parent/sibling w/ CABG, MI or angioplasty before 65F 55M? Not Asked   Social History Narrative     Not on file       FAMILY HISTORY: Reviewed in EMR      REVIEW OF SYSTEMS: Positive for that noted in past medical history and history of present illness and otherwise reviewed in EMR     PHYSICAL EXAM:    Adult female in no acute distress. Articulates and communicates with normal affect.  Respirations even and unlabored  Focused upper extremity exam: Skin intact. No erythema. Sensation intact all dermatomes into the hand to light touch. EPL, FPL, and Intrinsics intact. Right shoulder active motion is FE to 170, ER at side to 90, and IR to T12. Left shoulder active motion is FE to 175, ER to 90, and IR to T10.  Patient has gobal discomfort through shoulder on most all provocative tests. Pain with Neer and Dean though at all sites, including medial clavicle. Pain on palpation over the AC joint but also along the clavicle and acromion.  Pain on palpation over the long head of the biceps but does not localize to only this site anteriorly.     IMAGING:  Right shoulder XR demonstrates very slight cranial offset of the distal clavicle.     MRI right shoulder 7/16/19 was reviewed and demonstrates some mild tendinosis of the supraspinatus. There is mild intrasubstance tendinosis (vs small area of fraying) in the infraspinatus on one image.       ASSESSMENT:    1. Right shoulder chronic pain s/p injury  2. Possible R sternoclavicular joint pain    PLAN:  I discussed the imaging and exam findings with the patient and I outlined that I do not see a surgical option which would reliably improve her symptoms. We discussed nonsurgical treatment options including an XR guided sternoclavicular joint injection. If she were to pursue this I would also advise she initiate care with a Pain  Management clinic to incorporate a multi-modal approach to her nonoperative treatment.   I will also communicate my recommendations to Dr. Bolton.     Gillian Tomlinson MD      Again, thank you for allowing me to participate in the care of your patient.        Sincerely,        Gillian Tomlinson MD

## 2020-08-27 NOTE — PROGRESS NOTES
CHIEF CONCERN: Right shoulder pain, right clavicle pain    HISTORY OF PRESENT ILLNESS:  Melissa is a pleasant 24 year old right hand dominant woman who presents for evaluation of the above concern.  Pain began on 7/1/2019 after lifting heavy boxes off a shelf at work.  She felt a pop and had immediate pain.  She was evaluated at an emergency department and sent home with a sling.  She saw a family practice provider and Dr. Marinelli in orthopedics who ordered x-rays, an MRI and physical therapy.  She did physical therapy for approximately 4 months and was discharged due to reaching a plateau in improvement. She has also had 2 corticosteroid injections.  The first injection was not beneficial. The last injection on 4/30/2020 brought relief in shoulder symptoms for about 3 months, however she continued to have clavicle pain.  She has difficulty with repetitive motions with the right arm and is unable to lift with the right arm.  Pain today is a 6 on a scale of 1-10.  She localizes pain to 3 sites: 1) posterior acromion; 2) medial clavicle near but not quite at the right sternoclavicular joint; 3)Periscapular muscles.      Past Medical History:   Diagnosis Date     Uncomplicated asthma        Past Surgical History:   Procedure Laterality Date     DAVINCI LAPAROSCOPIC CHOLECYSTECTOMY WITHOUT GRAMS N/A 5/20/2020    Procedure: CHOLECYSTECTOMY, ROBOT-ASSISTED, LAPAROSCOPIC, WITHOUT CHOLANGIOGRAM;  Surgeon: Kaylee Cobb MD;  Location:  OR     DAVINCI XI HERNIORRHAPHY INGUINAL Right 5/20/2020    Procedure: ROBOTIC ASSISTED RIGHT INGUINAL EXPLORATION AND HERNIA REPAIR WITH MESH;  Surgeon: Kaylee Cobb MD;  Location:  OR     HERNIA REPAIR  12/2018       Current Outpatient Medications   Medication Sig Dispense Refill     acetaminophen (TYLENOL) 325 MG tablet Take 2 tablets (650 mg) by mouth every 4 hours as needed for pain       ibuprofen (ADVIL/MOTRIN) 600 MG tablet Take 600 mg by mouth       lidocaine HCl 3 %  cream        medroxyPROGESTERone (DEPO-PROVERA) 150 MG/ML IM injection Inject 150 mg into the muscle       oxyCODONE (ROXICODONE) 5 MG tablet Take 1-2 tablets (5-10 mg) by mouth every 4 hours as needed for moderate to severe pain 18 tablet 0     polyethylene glycol (MIRALAX) 17 g packet Take 17 g by mouth 2 times daily Take 1 packet twice daily until having bowel movements.  Then take 1 packet daily until bowel movements become regular. 10 packet 0        No Known Allergies    SOCIAL HISTORY:    Social History     Socioeconomic History     Marital status: Single     Spouse name: Not on file     Number of children: Not on file     Years of education: Not on file     Highest education level: Not on file   Occupational History     Not on file   Social Needs     Financial resource strain: Not on file     Food insecurity     Worry: Not on file     Inability: Not on file     Transportation needs     Medical: Not on file     Non-medical: Not on file   Tobacco Use     Smoking status: Never Smoker     Smokeless tobacco: Never Used   Substance and Sexual Activity     Alcohol use: Yes     Comment: 1 every few months     Drug use: Not Currently     Sexual activity: Not on file   Lifestyle     Physical activity     Days per week: Not on file     Minutes per session: Not on file     Stress: Not on file   Relationships     Social connections     Talks on phone: Not on file     Gets together: Not on file     Attends Latter day service: Not on file     Active member of club or organization: Not on file     Attends meetings of clubs or organizations: Not on file     Relationship status: Not on file     Intimate partner violence     Fear of current or ex partner: Not on file     Emotionally abused: Not on file     Physically abused: Not on file     Forced sexual activity: Not on file   Other Topics Concern     Parent/sibling w/ CABG, MI or angioplasty before 65F 55M? Not Asked   Social History Narrative     Not on file       FAMILY  HISTORY: Reviewed in EMR      REVIEW OF SYSTEMS: Positive for that noted in past medical history and history of present illness and otherwise reviewed in EMR     PHYSICAL EXAM:    Adult female in no acute distress. Articulates and communicates with normal affect.  Respirations even and unlabored  Focused upper extremity exam: Skin intact. No erythema. Sensation intact all dermatomes into the hand to light touch. EPL, FPL, and Intrinsics intact. Right shoulder active motion is FE to 170, ER at side to 90, and IR to T12. Left shoulder active motion is FE to 175, ER to 90, and IR to T10.  Patient has gobal discomfort through shoulder on most all provocative tests. Pain with Neer and Dean though at all sites, including medial clavicle. Pain on palpation over the AC joint but also along the clavicle and acromion.  Pain on palpation over the long head of the biceps but does not localize to only this site anteriorly.     IMAGING:  Right shoulder XR demonstrates very slight cranial offset of the distal clavicle.     MRI right shoulder 7/16/19 was reviewed and demonstrates some mild tendinosis of the supraspinatus. There is mild intrasubstance tendinosis (vs small area of fraying) in the infraspinatus on one image.       ASSESSMENT:    1. Right shoulder chronic pain s/p injury  2. Possible R sternoclavicular joint pain    PLAN:  I discussed the imaging and exam findings with the patient and I outlined that I do not see a surgical option which would reliably improve her symptoms. We discussed nonsurgical treatment options including an XR guided sternoclavicular joint injection. If she were to pursue this I would also advise she initiate care with a Pain Management clinic to incorporate a multi-modal approach to her nonoperative treatment.   I will also communicate my recommendations to Dr. Bolton.     Gillian Tomlinson MD

## (undated) DEVICE — ESU GROUND PAD UNIVERSAL W/O CORD

## (undated) DEVICE — POUCH TISSUE RETRIEVAL ROBOTIC 8MM 5.1" INTRO TRS-ROBO-8

## (undated) DEVICE — DAVINCI HOT SHEARS TIP COVER  400180

## (undated) DEVICE — DAVINCI XI CLIP APPLIER LG HEM-O-CLIP 8MM 470230

## (undated) DEVICE — LINEN TOWEL PACK X5 5464

## (undated) DEVICE — DAVINCI XI NDL DRIVER MEGA SUTURE CUT 8MM 470309

## (undated) DEVICE — ENDO TROCAR FIRST ENTRY KII FIOS Z-THRD 05X100MM CTF03

## (undated) DEVICE — DAVINCI XI FCP BIPOLAR FENESTRATED 470205

## (undated) DEVICE — SOL WATER IRRIG 1000ML BOTTLE 2F7114

## (undated) DEVICE — SU VICRYL 0 UR-6 27" J603H

## (undated) DEVICE — DAVINCI XI MONOPOLAR SCISSORS HOT SHEARS 8MM 470179

## (undated) DEVICE — DAVINCI XI GRASPER ENDOWRIST PROGRASP 470093

## (undated) DEVICE — PACK LAP CHOLE SLC15LCFSD

## (undated) DEVICE — SYSTEM CLEARIFY VISUALIZATION 21-345

## (undated) DEVICE — DAVINCI XI SEAL UNIVERSAL 5-8MM 470361

## (undated) DEVICE — LIGHT HANDLE X2

## (undated) DEVICE — LUBRICANT INST ELECTROLUBE EL101

## (undated) DEVICE — SU VICRYL 4-0 PS-2 18" UND J496H

## (undated) DEVICE — DAVINCI XI DRAPE ARM 470015

## (undated) DEVICE — DAVINCI XI DRAPE COLUMN 470341

## (undated) DEVICE — PREP CHLORAPREP 26ML TINTED ORANGE  260815

## (undated) DEVICE — BLADE CLIPPER 4406

## (undated) DEVICE — GLOVE PROTEXIS W/NEU-THERA 6.5  2D73TE65

## (undated) DEVICE — DRAPE SHEET REV FOLD 3/4 9349

## (undated) DEVICE — SU STRATAFIX PDS PLUS 3-0 SPIRAL SH 15CM SXPP1B420

## (undated) DEVICE — SU VICRYL 3-0 SH 27" J316H

## (undated) DEVICE — DAVINCI XI OBTURATOR BLADELESS 8MM 470359

## (undated) RX ORDER — BUPIVACAINE HYDROCHLORIDE AND EPINEPHRINE 5; 5 MG/ML; UG/ML
INJECTION, SOLUTION EPIDURAL; INTRACAUDAL; PERINEURAL
Status: DISPENSED
Start: 2020-05-20

## (undated) RX ORDER — DEXAMETHASONE SODIUM PHOSPHATE 4 MG/ML
INJECTION, SOLUTION INTRA-ARTICULAR; INTRALESIONAL; INTRAMUSCULAR; INTRAVENOUS; SOFT TISSUE
Status: DISPENSED
Start: 2020-05-20

## (undated) RX ORDER — NEOSTIGMINE METHYLSULFATE 1 MG/ML
VIAL (ML) INJECTION
Status: DISPENSED
Start: 2020-05-20

## (undated) RX ORDER — FENTANYL CITRATE 0.05 MG/ML
INJECTION, SOLUTION INTRAMUSCULAR; INTRAVENOUS
Status: DISPENSED
Start: 2020-05-20

## (undated) RX ORDER — CEFAZOLIN SODIUM 1 G/3ML
INJECTION, POWDER, FOR SOLUTION INTRAMUSCULAR; INTRAVENOUS
Status: DISPENSED
Start: 2020-05-20

## (undated) RX ORDER — LIDOCAINE HYDROCHLORIDE 20 MG/ML
INJECTION, SOLUTION EPIDURAL; INFILTRATION; INTRACAUDAL; PERINEURAL
Status: DISPENSED
Start: 2020-05-20

## (undated) RX ORDER — GLYCOPYRROLATE 0.2 MG/ML
INJECTION, SOLUTION INTRAMUSCULAR; INTRAVENOUS
Status: DISPENSED
Start: 2020-05-20

## (undated) RX ORDER — VECURONIUM BROMIDE 1 MG/ML
INJECTION, POWDER, LYOPHILIZED, FOR SOLUTION INTRAVENOUS
Status: DISPENSED
Start: 2020-05-20

## (undated) RX ORDER — OXYCODONE HYDROCHLORIDE 5 MG/1
TABLET ORAL
Status: DISPENSED
Start: 2020-05-20

## (undated) RX ORDER — FENTANYL CITRATE 50 UG/ML
INJECTION, SOLUTION INTRAMUSCULAR; INTRAVENOUS
Status: DISPENSED
Start: 2020-05-20

## (undated) RX ORDER — ONDANSETRON 2 MG/ML
INJECTION INTRAMUSCULAR; INTRAVENOUS
Status: DISPENSED
Start: 2020-05-20

## (undated) RX ORDER — HYDROMORPHONE HYDROCHLORIDE 1 MG/ML
INJECTION, SOLUTION INTRAMUSCULAR; INTRAVENOUS; SUBCUTANEOUS
Status: DISPENSED
Start: 2020-05-20

## (undated) RX ORDER — PROPOFOL 10 MG/ML
INJECTION, EMULSION INTRAVENOUS
Status: DISPENSED
Start: 2020-05-20